# Patient Record
Sex: FEMALE | Race: WHITE | URBAN - METROPOLITAN AREA
[De-identification: names, ages, dates, MRNs, and addresses within clinical notes are randomized per-mention and may not be internally consistent; named-entity substitution may affect disease eponyms.]

---

## 2018-06-18 ENCOUNTER — CONVERSION ENCOUNTER (OUTPATIENT)
Dept: MAMMOGRAPHY | Facility: HOSPITAL | Age: 68
End: 2018-06-18

## 2019-03-19 ENCOUNTER — OFFICE VISIT CONVERTED (OUTPATIENT)
Dept: NEUROSURGERY | Facility: CLINIC | Age: 69
End: 2019-03-19
Attending: NEUROLOGICAL SURGERY

## 2019-03-19 ENCOUNTER — CONVERSION ENCOUNTER (OUTPATIENT)
Dept: NEUROLOGY | Facility: CLINIC | Age: 69
End: 2019-03-19

## 2019-03-26 ENCOUNTER — HOSPITAL ENCOUNTER (OUTPATIENT)
Dept: MRI IMAGING | Facility: HOSPITAL | Age: 69
Discharge: HOME | End: 2019-03-26
Attending: NEUROLOGICAL SURGERY

## 2019-04-18 ENCOUNTER — OFFICE VISIT CONVERTED (OUTPATIENT)
Dept: NEUROSURGERY | Facility: CLINIC | Age: 69
End: 2019-04-18
Attending: NEUROLOGICAL SURGERY

## 2019-04-30 ENCOUNTER — HOSPITAL ENCOUNTER (OUTPATIENT)
Dept: PREADMISSION TESTING | Facility: HOSPITAL | Age: 69
Setting detail: HOSPITAL OUTPATIENT SURGERY
Discharge: HOME | End: 2019-04-30
Attending: NEUROLOGICAL SURGERY

## 2019-04-30 LAB
ANION GAP SERPL CALC-SCNC: 15 MMOL/L (ref 8–19)
BASOPHILS # BLD AUTO: 0.03 10*3/UL (ref 0–0.2)
BASOPHILS NFR BLD AUTO: 0.4 % (ref 0–3)
BUN SERPL-MCNC: 18 MG/DL (ref 5–25)
BUN/CREAT SERPL: 12 {RATIO} (ref 6–20)
CALCIUM SERPL-MCNC: 9.3 MG/DL (ref 8.7–10.4)
CHLORIDE SERPL-SCNC: 99 MMOL/L (ref 99–111)
CONV ABS IMM GRAN: 0.02 10*3/UL (ref 0–0.2)
CONV CO2: 30 MMOL/L (ref 22–32)
CONV IMMATURE GRAN: 0.3 % (ref 0–1.8)
CREAT UR-MCNC: 1.45 MG/DL (ref 0.5–0.9)
DEPRECATED RDW RBC AUTO: 45.2 FL (ref 36.4–46.3)
EOSINOPHIL # BLD AUTO: 0.16 10*3/UL (ref 0–0.7)
EOSINOPHIL # BLD AUTO: 2 % (ref 0–7)
ERYTHROCYTE [DISTWIDTH] IN BLOOD BY AUTOMATED COUNT: 13.3 % (ref 11.7–14.4)
GFR SERPLBLD BASED ON 1.73 SQ M-ARVRAT: 36 ML/MIN/{1.73_M2}
GLUCOSE SERPL-MCNC: 109 MG/DL (ref 65–99)
HBA1C MFR BLD: 13.7 G/DL (ref 12–16)
HCT VFR BLD AUTO: 43.8 % (ref 37–47)
LYMPHOCYTES # BLD AUTO: 1.35 10*3/UL (ref 1–5)
MCH RBC QN AUTO: 28.7 PG (ref 27–31)
MCHC RBC AUTO-ENTMCNC: 31.3 G/DL (ref 33–37)
MCV RBC AUTO: 91.8 FL (ref 81–99)
MONOCYTES # BLD AUTO: 0.48 10*3/UL (ref 0.2–1.2)
MONOCYTES NFR BLD AUTO: 6.1 % (ref 3–10)
NEUTROPHILS # BLD AUTO: 5.85 10*3/UL (ref 2–8)
NEUTROPHILS NFR BLD AUTO: 74.1 % (ref 30–85)
NRBC CBCN: 0 % (ref 0–0.7)
OSMOLALITY SERPL CALC.SUM OF ELEC: 292 MOSM/KG (ref 273–304)
PLATELET # BLD AUTO: 178 10*3/UL (ref 130–400)
PMV BLD AUTO: 9.8 FL (ref 9.4–12.3)
POTASSIUM SERPL-SCNC: 4 MMOL/L (ref 3.5–5.3)
RBC # BLD AUTO: 4.77 10*6/UL (ref 4.2–5.4)
SODIUM SERPL-SCNC: 140 MMOL/L (ref 135–147)
VARIANT LYMPHS NFR BLD MANUAL: 17.1 % (ref 20–45)
WBC # BLD AUTO: 7.89 10*3/UL (ref 4.8–10.8)

## 2019-05-06 ENCOUNTER — HOSPITAL ENCOUNTER (OUTPATIENT)
Dept: PERIOP | Facility: HOSPITAL | Age: 69
Setting detail: HOSPITAL OUTPATIENT SURGERY
Discharge: HOME | End: 2019-05-06
Attending: NEUROLOGICAL SURGERY

## 2019-05-06 LAB
GLUCOSE BLD-MCNC: 130 MG/DL (ref 65–99)
GLUCOSE BLD-MCNC: 159 MG/DL (ref 65–99)

## 2019-05-30 ENCOUNTER — OFFICE VISIT CONVERTED (OUTPATIENT)
Dept: NEUROSURGERY | Facility: CLINIC | Age: 69
End: 2019-05-30
Attending: PHYSICIAN ASSISTANT

## 2019-08-07 ENCOUNTER — HOSPITAL ENCOUNTER (OUTPATIENT)
Dept: OTHER | Facility: HOSPITAL | Age: 69
Discharge: HOME | End: 2019-08-07
Attending: PHYSICAL MEDICINE & REHABILITATION

## 2019-08-07 LAB
APPEARANCE UR: CLEAR
BILIRUB UR QL: NEGATIVE
COLOR UR: YELLOW
CONV BACTERIA: ABNORMAL
CONV COLLECTION SOURCE (UA): ABNORMAL
CONV HYALINE CASTS IN URINE MICRO: ABNORMAL /[LPF]
CONV UROBILINOGEN IN URINE BY AUTOMATED TEST STRIP: 0.2 {EHRLICHU}/DL (ref 0.1–1)
GLUCOSE UR QL: NEGATIVE MG/DL
HGB UR QL STRIP: NEGATIVE
KETONES UR QL STRIP: NEGATIVE MG/DL
LEUKOCYTE ESTERASE UR QL STRIP: ABNORMAL
NITRITE UR QL STRIP: POSITIVE
PH UR STRIP.AUTO: 5.5 [PH] (ref 5–8)
PROT UR QL: NEGATIVE MG/DL
RBC #/AREA URNS HPF: ABNORMAL /[HPF]
SP GR UR: 1.01 (ref 1–1.03)
WBC #/AREA URNS HPF: ABNORMAL /[HPF]

## 2019-08-10 LAB
AMOXICILLIN+CLAV SUSC ISLT: 8
AMPICILLIN SUSC ISLT: >=32
AMPICILLIN+SULBAC SUSC ISLT: 16
BACTERIA UR CULT: ABNORMAL
CEFAZOLIN SUSC ISLT: <=4
CEFEPIME SUSC ISLT: <=1
CEFTAZIDIME SUSC ISLT: <=1
CEFTRIAXONE SUSC ISLT: <=1
CEFUROXIME ORAL SUSC ISLT: >=64
CEFUROXIME PARENTER SUSC ISLT: >=64
CIPROFLOXACIN SUSC ISLT: >=4
ERTAPENEM SUSC ISLT: <=0.5
GENTAMICIN SUSC ISLT: <=1
LEVOFLOXACIN SUSC ISLT: >=8
NITROFURANTOIN SUSC ISLT: <=16
TETRACYCLINE SUSC ISLT: >=16
TMP SMX SUSC ISLT: >=320
TOBRAMYCIN SUSC ISLT: <=1

## 2019-09-25 ENCOUNTER — HOSPITAL ENCOUNTER (OUTPATIENT)
Dept: OTHER | Facility: HOSPITAL | Age: 69
Discharge: HOME | End: 2019-09-25
Attending: PHYSICAL MEDICINE & REHABILITATION

## 2019-09-25 LAB
C DIFF TOX B STL QL CT TISS CULT: NEGATIVE
CONV 027 TOXIN: NEGATIVE

## 2019-09-27 LAB — BACTERIA SPEC AEROBE CULT: NORMAL

## 2019-10-10 ENCOUNTER — HOSPITAL ENCOUNTER (OUTPATIENT)
Dept: OTHER | Facility: HOSPITAL | Age: 69
Discharge: HOME | End: 2019-10-10
Attending: PHYSICAL MEDICINE & REHABILITATION

## 2019-10-10 LAB
APPEARANCE UR: CLEAR
BILIRUB UR QL: NEGATIVE
COLOR UR: YELLOW
CONV COLLECTION SOURCE (UA): NORMAL
CONV UROBILINOGEN IN URINE BY AUTOMATED TEST STRIP: 0.2 {EHRLICHU}/DL (ref 0.1–1)
GLUCOSE UR QL: NEGATIVE MG/DL
HGB UR QL STRIP: NEGATIVE
KETONES UR QL STRIP: NEGATIVE MG/DL
LEUKOCYTE ESTERASE UR QL STRIP: NEGATIVE
NITRITE UR QL STRIP: NEGATIVE
PH UR STRIP.AUTO: 5 [PH] (ref 5–8)
PROT UR QL: NEGATIVE MG/DL
SP GR UR: 1.01 (ref 1–1.03)

## 2019-10-12 LAB
AMOXICILLIN+CLAV SUSC ISLT: >=32
AMPICILLIN SUSC ISLT: >=32
AMPICILLIN+SULBAC SUSC ISLT: >=32
BACTERIA UR CULT: ABNORMAL
CEFAZOLIN SUSC ISLT: >=64
CEFEPIME SUSC ISLT: <=1
CEFTAZIDIME SUSC ISLT: >=64
CEFTRIAXONE SUSC ISLT: >=64
CEFUROXIME ORAL SUSC ISLT: >=64
CEFUROXIME PARENTER SUSC ISLT: >=64
CIPROFLOXACIN SUSC ISLT: <=0.25
ERTAPENEM SUSC ISLT: <=0.5
GENTAMICIN SUSC ISLT: <=1
LEVOFLOXACIN SUSC ISLT: <=0.12
NITROFURANTOIN SUSC ISLT: <=16
TETRACYCLINE SUSC ISLT: <=1
TMP SMX SUSC ISLT: <=20
TOBRAMYCIN SUSC ISLT: <=1

## 2019-11-01 ENCOUNTER — HOSPITAL ENCOUNTER (OUTPATIENT)
Dept: MAMMOGRAPHY | Facility: HOSPITAL | Age: 69
Discharge: HOME | End: 2019-11-01
Attending: NURSE PRACTITIONER

## 2019-11-06 ENCOUNTER — OFFICE VISIT CONVERTED (OUTPATIENT)
Dept: GASTROENTEROLOGY | Facility: CLINIC | Age: 69
End: 2019-11-06
Attending: NURSE PRACTITIONER

## 2020-02-01 ENCOUNTER — LAB REQUISITION (OUTPATIENT)
Dept: LAB | Facility: HOSPITAL | Age: 70
End: 2020-02-01

## 2020-02-01 DIAGNOSIS — Z00.00 ROUTINE GENERAL MEDICAL EXAMINATION AT A HEALTH CARE FACILITY: ICD-10-CM

## 2020-02-01 LAB
ANION GAP SERPL CALCULATED.3IONS-SCNC: 19.1 MMOL/L (ref 5–15)
BUN BLD-MCNC: 49 MG/DL (ref 8–23)
BUN/CREAT SERPL: 10.5 (ref 7–25)
CALCIUM SPEC-SCNC: 8.2 MG/DL (ref 8.6–10.5)
CHLORIDE SERPL-SCNC: 90 MMOL/L (ref 98–107)
CO2 SERPL-SCNC: 25.9 MMOL/L (ref 22–29)
CREAT BLD-MCNC: 4.67 MG/DL (ref 0.57–1)
GFR SERPL CREATININE-BSD FRML MDRD: 11 ML/MIN/1.73
GFR SERPL CREATININE-BSD FRML MDRD: 9 ML/MIN/1.73
GLUCOSE BLD-MCNC: 62 MG/DL (ref 65–99)
POTASSIUM BLD-SCNC: 3.7 MMOL/L (ref 3.5–5.2)
SODIUM BLD-SCNC: 135 MMOL/L (ref 136–145)

## 2020-02-01 PROCEDURE — 80048 BASIC METABOLIC PNL TOTAL CA: CPT

## 2020-02-27 ENCOUNTER — OFFICE VISIT CONVERTED (OUTPATIENT)
Dept: PULMONOLOGY | Facility: CLINIC | Age: 70
End: 2020-02-27
Attending: PHYSICIAN ASSISTANT

## 2020-03-13 ENCOUNTER — HOSPITAL ENCOUNTER (OUTPATIENT)
Dept: OTHER | Facility: HOSPITAL | Age: 70
Discharge: HOME | End: 2020-03-13

## 2020-03-14 ENCOUNTER — HOSPITAL ENCOUNTER (OUTPATIENT)
Dept: OTHER | Facility: HOSPITAL | Age: 70
Discharge: HOME | End: 2020-03-14

## 2020-03-14 LAB
ANION GAP SERPL CALC-SCNC: 15 MMOL/L (ref 8–19)
BUN SERPL-MCNC: 32 MG/DL (ref 5–25)
BUN/CREAT SERPL: 24 {RATIO} (ref 6–20)
CALCIUM SERPL-MCNC: 8.4 MG/DL (ref 8.7–10.4)
CHLORIDE SERPL-SCNC: 99 MMOL/L (ref 99–111)
CONV CO2: 28 MMOL/L (ref 22–32)
CREAT UR-MCNC: 1.33 MG/DL (ref 0.5–0.9)
GFR SERPLBLD BASED ON 1.73 SQ M-ARVRAT: 40 ML/MIN/{1.73_M2}
GLUCOSE SERPL-MCNC: 84 MG/DL (ref 65–99)
OSMOLALITY SERPL CALC.SUM OF ELEC: 294 MOSM/KG (ref 273–304)
POTASSIUM SERPL-SCNC: 3.4 MMOL/L (ref 3.5–5.3)
SODIUM SERPL-SCNC: 139 MMOL/L (ref 135–147)

## 2020-03-18 ENCOUNTER — HOSPITAL ENCOUNTER (OUTPATIENT)
Dept: OTHER | Facility: HOSPITAL | Age: 70
Discharge: HOME | End: 2020-03-18

## 2020-03-18 LAB
FLUAV RNA SPEC QL NAA+PROBE: NEGATIVE
FLUBV RNA ISLT QL NAA+PROBE: NEGATIVE

## 2020-03-20 LAB — BACTERIA SPEC AEROBE CULT: NORMAL

## 2021-05-15 VITALS
SYSTOLIC BLOOD PRESSURE: 157 MMHG | WEIGHT: 234 LBS | BODY MASS INDEX: 36.73 KG/M2 | DIASTOLIC BLOOD PRESSURE: 71 MMHG | HEART RATE: 74 BPM | HEIGHT: 67 IN

## 2021-05-15 VITALS
WEIGHT: 228.25 LBS | SYSTOLIC BLOOD PRESSURE: 154 MMHG | DIASTOLIC BLOOD PRESSURE: 53 MMHG | BODY MASS INDEX: 34.59 KG/M2 | HEIGHT: 68 IN

## 2021-05-15 VITALS
SYSTOLIC BLOOD PRESSURE: 168 MMHG | WEIGHT: 229.25 LBS | DIASTOLIC BLOOD PRESSURE: 71 MMHG | BODY MASS INDEX: 34.75 KG/M2 | HEIGHT: 68 IN

## 2021-05-28 VITALS
RESPIRATION RATE: 16 BRPM | BODY MASS INDEX: 33.74 KG/M2 | OXYGEN SATURATION: 98 % | SYSTOLIC BLOOD PRESSURE: 127 MMHG | DIASTOLIC BLOOD PRESSURE: 45 MMHG | HEART RATE: 79 BPM | WEIGHT: 215 LBS | HEIGHT: 67 IN | TEMPERATURE: 98.2 F

## 2021-05-28 NOTE — PROGRESS NOTES
Patient: MICHI LUNA     Acct: [id number]     Report: #[id number]  UNIT #: [id number]     : 1950    Encounter Date:2020  PRIMARY CARE: RADHA RAZA  ***Signed***  --------------------------------------------------------------------------------------------------------------------  Chief Complaint      Encounter Date      2020            Primary Care Provider      RADHA RAZA            Referring Provider            [facility]            Patient Complaint      Patient is complaining of      Patient is here for [facility] f/u            VITALS      Height 5 ft 7 in / 170.18 cm      Weight 215 lbs  / 97.468264 kg      BSA 2.09 m2      BMI 33.7 kg/m2      Temperature 98.2 F / 36.78 C - Oral      Pulse 79      Respirations 16      Blood Pressure 127/45 Sitting, Left Arm      Pulse Oximetry 98%, room air            HPI      The patient is a very pleasant 70 year white female recently seen by Dr. Lopez     at [facility] in 2020. Prior to that she was seen by Dr. Bruner and Dr. Mcmanus at the end of 2020. She has presented for her     first hospitalization with shortness of breath and treated for diastolic heart     failure. She was diuresed and gradually improved, was at a nursing facility for     rehab and then readmitted beginning on 2020 and seen by Dr. Lopez and Dr. Bruner after being found to have acute hypoxic respiratory failure. She     required BiPAP ,  was found to have ESBL urinary tract infection and was treated    with antibiotics. She also appeared to have pulmonary edema on chest x-ray and     was diuresed, seen by nephrology for acute on chronic kidney disease. She     gradually improved and was able to be discharged home. She was also treated for     apparent chronic obstructive pulmonary disease exacerbation. The patient states     she was diagnosed with chronic obstructive pulmonary disease many years ago and     has not been followed  by a pulmonologist. She only smoked for about 10 years 1     pack per day or less and quit smoking in her 20s. She has been on advair 250/50     discus and spiriva that she gets a [employer]. She has also been using Brovana and    Pulmicort nebulizer since her discharge and was not aware that she was not     supposed to be using both of these. She has DuoNeb to use as needed. Her main     complaint is that she is weak and deconditioned after her recent     hospitalizations. She will be doing home physical therapy which starts tomorrow.    She denies any dyspnea that limits her day to day activities, more so limited by    weakness and deconditioning. She denies coughing, wheezing, hemoptysis, fever or    chills.             I reviewed the Review of Systems, medical, surgical and family history and agree    with those as entered.      Copies To:   JANE OLIVAREZ      Constitutional:  Denies: Fatigue, Fever, Weight gain, Weight loss, Chills,     Insomnia, Other      Respiratory/Breathing:  Denies: Shortness of air, Wheezing, Cough, Hemoptysis,     Pleuritic pain, Other      Endocrine:  Denies: Polydipsia, Polyuria, Heat/cold intolerance, Abnorml     menstrual pattern, Diabetes, Other      Eyes:  Denies: Blurred vision, Vision Changes, Other      Ears, nose, mouth, throat:  Denies: Mouth lesions, Thrush, Throat pain,     Hoarseness, Allergies/Hay Fever, Post Nasal Drip, Headaches, Recent Head Injury,    Nose Bleeding, Neck Stiffness, Thyroid Mass, Hearing Loss, Ear Fullness, Dry     Mouth, Nasal or Sinus Pain, Dry Lips, Nasal discharge, Nasal congestion, Other      Cardiovascular:  Denies: Palpitations, Syncope, Claudication, Chest Pain, Wake     up Gasping for air, Leg Swelling, Irregular Heart Rate, Cyanosis, Dyspnea on     Exertion, Other      Gastrointestinal:  Denies: Nausea, Constipation, Diarrhea, Abdominal pain,     Vomiting, Difficulty Swallowing, Reflux/Heartburn, Dysphagia, Jaundice,     Bloating,  "Melena, Bloody stools, Other      Genitourinary:  Denies: Urinary frequency, Incontinence, Hematuria, Urgency,     Nocturia, Dysuria, Testicular problems, Other      Musculoskeletal:  Denies: Joint Pain, Joint Stiffness, Joint Swelling, Myalgias,    Other      Hematologic/lymphatic:  DENIES: Lymphadenopathy, Bruising, Bleeding tendencies,     Other      Neurological:  Denies: Headache, Numbness, Weakness, Seizures, Other      Psychiatric:  Denies: Anxiety, Appropriate Effect, Depression, Other      Sleep:  No: Excessive daytime sleep, Morning Headache?, Snoring, Insomnia?, Stop    breathing at sleep?, Other      Integumentary:  Denies: Rash, Dry skin, Skin Warm to Touch, Other      Immunologic/Allergic:  Denies: Latex allergy, Seasonal allergies, Asthma,     Urticaria, Eczema, Other      Immunization status:  No: Up to date            FAMILY/SOCIAL/MEDICAL HX      Current History            Problems         Chronic Problems:         V76.12 OTH SCREEN MAMMO-MALIGN NEOPLASM OF BREAST (Onset: 2/11/13)         250.00 Diabetes mellitus (Onset: 2/11/13)         OTHER MEDICAL PROVIDERS (Onset: 2/11/13, Chronic)              ortho- ft. prather              gyn- ft. prather              [facility]              [facility]                       Resolved Problems:         244.9 Hypothyroidism (Recorded: 2/11/13)         780.57 Sleep apnea (Recorded: 2/11/13)         333.94 Restless legs (Recorded: 2/11/13)         250.00 Diabetes mellitus type 2 (Recorded: 2/11/13)         496 Chronic obstructive lung disease (Recorded: 2/11/13)      Surgical History:  Yes: Abdominal Surgery (Gastric Bypass 7/14/2008), Bladder     Surgery (Bladder repair 12-6-05), Bowel Surgery (COLONOSCOPY), Head Surgery     (BILATERAL CATARACTS ), Vascular Surgery (states \"carotid artery surgery 2017\"),    Other Surgeries (Cardiac Cath 9/18/2003); No: Appendectomy, CABG,     Cholecystectomy, Oral Surgery, Orthopedic Surgery (R knee replacement 3/24/2010,    L " "KNEE 6/18/2014, discs in back)      Diabetes - Family Hx:  Father      Cancer/Type - Family Hx:  Mother (Pacrease/Breast Ca), Father (Bladder Ca),     Sister (Squamous)      Is Father Still Living?:  No      Is Mother Still Living?:  No      Social History:  No Tobacco Use, No Alcohol Use, No Recreational Drug use      Smoking status:  Former smoker (less and 1 ppd x 12 years quit in 1995)      Exercise Activity:  None      Occupation:  Retired      [place]/Social Activities:  Reading      Whom do you live with?:  Spouse      Number of Pregnancies:  1      Number of Delivers:  1      Age at menopause:  48      Anticoagulation Therapy:  No      Antibiotic Prophylaxis:  No      Medical History:  Yes: Allergies, Arthritis (HANDS, BACK), Chronic     Bronchitis/COPD (USES INHALERS), Congestive Heart Failu, Depression, Anxiety,     Diabetes (TYPE II, BS RUN \"110-149\"), Heart Attack (august 25th 2017- HEART     CATHS 2003/2017/2019), Hemorrhoids/Rectal Prob (COLON POLYPS/IBS/ACID REFLUX),     High Blood Pressure (ON MEDICATIONS ), High Cholesterol, Shortness Of Breath     (flash pulm edema 2017/2019, resp failure/hypoxia 2/14/20), Thyroid Problem; No:    Asthma, Blood Disease, Chemotherapy/Cancer, Deafness or Ringing Ears, Seizures,     Night sweats, Miscellaneous Medical/oth      Psychiatric History      anxiety and depression            PREVENTION      Hx Influenza Vaccination:  Yes      Date Influenza Vaccine Given:  Nov 1, 2019      Influenza Vaccine Declined:  No      2 or More Falls Past Year?:  No      Fall Past Year with Injury?:  No      Hx Pneumococcal Vaccination:  No      Encouraged to follow-up with:  PCP regarding preventative exams.      Chart initiated by      Marizol Thibodeaux MA            ALLERGIES/MEDICATIONS      Allergies:        Coded Allergies:             NITROFURANTOIN (Verified  Adverse Reaction, Severe, RASH, 2/27/20)      Medications    Last Reconciled on 2/27/20 09:21 by TANGELA STEPHENSON    "   MDI-Advair 250/50 (Advair 250/50 Diskus) 1 Each Blst.w.dev      1 PUFF INH HS, #1 INH 0 Refills         Reported         2/27/20       Albuterol/Ipratropium (Duoneb) 3 Ml Ampul.neb      3 ML INH BID PRN for SHORTNESS OF BREATH, #120 NEB 0 Refills         Reported         2/27/20       Mouthwash (Magic Mouthwash) 1 Each Bottle      5 ML SWISH.SPIT Q6H PRN for MOUTH DISCOMFORT for 14 Days, #1 BOTTLE 0 Refills         Prov: Ankur Dai         2/19/20       Ertapenem Sodium (INVanz) 1 Gm Vial      1000 MG IV ONCE for 9 Days, #9 VIAL         Prov: Ankur Dai         2/19/20       Furosemide (Furosemide) 20 Mg Tablet      40 MG PO QDAY for 30 Days, #60 TAB 0 Refills         Prov: Ankur Dai         2/19/20       Glucosamine Hcl/Chondro Villalta A (GLUCOSAMINE-CHONDROITIN 500 mg/400 mg/10 ml) 480     Ml Liquid      10 ML PO BID, ML         Reported         2/14/20       Pramipexole (Pramipexole) 0.5 Mg Tab      0.5 MG PO QDAY for 30 Days, #30 TAB         Reported         2/14/20       Metoprolol Succinate (Metoprolol Succinate) 25 Mg Tab.er.24h      25 MG PO BID, #60 TAB 0 Refills         Reported         2/14/20       Acetaminophen (ACETAMINOPHEN) 325 Mg Tablet      650 MG PO Q4H PRN for MILD PAIN (1-3)/FEVER, #100 TAB         Reported         2/14/20       Gabapentin (Gabapentin) 800 Mg Tablet      800 MG PO BID, #60 TAB 0 Refills         Reported         2/14/20       Pantoprazole (Protonix) 40 Mg Tablet.      40 MG PO QDAY@07, #30 TAB 0 Refills         Reported         2/14/20       NIFEdipine ER (NIFEdipine ER) 30 Mg Tab.er.24      30 MG PO TID, #30 TAB.SR 0 Refills         Reported         2/14/20       Cholecalciferol (Vitamin D3) (Thera-D) 4,000 Unit Tablet      4000 UNITS PO QDAY for 30 Days, #30 TAB         Reported         2/14/20       Aspirin EC (Aspirin EC) 81 Mg Tablet.      81 MG PO QDAY@08 for 30 Days, #30 TAB.SR         Prov: Kingsley Wilburn         1/29/20       Neb-Budesonide (Budesonide) 0.5 Mg/2  Ml Ampul.neb      0.5 MG INH RTBID, #40 NEB 3 Refills         Prov: Joe Cunha         12/23/19       Potassium Chloride (Potassium Chloride) 10 Meq Capsule.er      20 MEQ PO QDAY, #30 CAP.ER 1 Refill         Prov: Joe Cunha         12/23/19       Arformoterol Tartrate (Brovana) 15 Mcg/2 Ml Vial.neb      15 MCG INH RTBID, #40 NEB 3 Refills         Prov: Joe Cunha         12/23/19       Calcium Carbonate (Calcium Carbonate) 200 Mg Tab.chew      200 MG PO BID, #30 TAB.CHEW         Reported         12/14/19       Tiotropium (Spiriva) 18 Mcg Inh      18 MCG INH RTQDAY, #1 INH 0 Refills         Reported         12/14/19       Sucralfate (Carafate) 1 Gm Tab      1 GM PO QIDAC for 28 Days, #112 TAB         Prov: CAMACHO GARCIA         12/10/19       traMADol HCl (traMADol HCl) 50 Mg Tablet      50 MG PO Q4H PRN for PAIN, TAB 0 Refills         Reported         12/6/19       Clobetasol Propionate (Temovate 0.05% Oint) 15 Gm Oint...g.      1 APL TOPICAL BID, TUBE         Reported         12/6/19       chlordiazePOXIDE-Clidinium 5-2.5 Mg (LIBRAX) 1 Each Capsule      1 CAP PO BID, CAP         Reported         4/19/19       Acarbose (Precose) 50 Mg Tab      50 MG PO QDAY, #90 TAB         Reported         4/19/19       Nitroglycerin (Nitroglycerin) 0.4 Mg Tab.subl      0.4 MG SL Q5MIN PRN for CHEST PAIN, #30 TAB         Prov: NIALL MORTON         9/7/17       Atorvastatin (Atorvastatin) 20 Mg Tab      20 MG PO HS, #30 TAB 0 Refills         Reported         7/15/16       Allopurinol (Allopurinol) 100 Mg Tablet      100 MG PO HS, #30 TAB 0 Refills         Reported         1/14/16       Levothyroxine (Synthroid) 0.125 Mg Tablet      125 MCG PO QAM, #90 TAB 1 Refill         Prov: RADHA WOODWARD         3/25/13       Multivitamins (Multi-Vitamin) 1 Tab Tablet      1 TAB PO QDAY         Reported         2/11/13       buPROPion HCl XL (Wellbutrin XL) 150 Mg Tab.sr.24h      150 MG PO QAM         Reported         2/11/13       Current Medications      Current Medications Reviewed 2/27/20            EXAM      VITAL SIGNS:  Reviewed.        NECK:  Supple without tracheal deviation or lymphadenopathy.  No thyromegaly     appreciated.      LYMPHATICS:  No cervical or supraclavicular lymphadenopathy.      HEENT: Pupils are equal, round and reactive to light. There is no scleral icte    jennifer.  Nares patent without hypertrophy of the turbinates. No erythema of the     passages.  TMs are clear bilaterally with good cone of light. The posterior     pharynx is without  lesions or erythema.      RESPIRATORY: Mildly decreased breath sounds throughout, no wheezes, rhonchi or     crackles, normal work of breathing noted.        CARDIOVASCULAR:  Regular rate and rhythm.  No murmurs, gallops or rubs.  No     lower extremity edema.  Equal radial pulses.        GI: Soft, nontender, nondistended, no organomegaly.  Bowel sounds present in all    four quadrants.      MUSCULOSKELETAL:  No joint effusions, erythema or warmth over the major joint     systems.      SKIN:  No rashes or lesions.      NEUROLOGIC: Cranial nerves II-XII are intact bilaterally.  Moves all     extremities. Ambulates with ease.      PSYCH:  Appropriate mood and affect.      Vtials      Vitals:             Height 5 ft 7 in / 170.18 cm           Weight 215 lbs  / 97.639230 kg           BSA 2.09 m2           BMI 33.7 kg/m2           Temperature 98.2 F / 36.78 C - Oral           Pulse 79           Respirations 16           Blood Pressure 127/45 Sitting, Left Arm           Pulse Oximetry 98%, room air            REVIEW      Results Reviewed      PCCS Results Reviewed?:  Yes Prev Lab Results, Yes Prev Radiology Results, Yes     Previous [facility] Records      Lab Results      I personally reviewed the patient's most recent pulmonary consultation, progress    notes and discharge summary.            Assessment      COPD (chronic obstructive pulmonary disease) - J44.9            Notes      New  Medications      * MDI-Advair 250/50 (Advair 250/50 Diskus) 1 EACH BLST.W.DEV: 1 PUFF INH HS #1      * Albuterol/Ipratropium (Duoneb) 3 ML AMPUL.NEB: 3 ML INH BID PRN SHORTNESS OF       BREATH #120         Instructions: DIAGNOSIS CODE REQUIRED PRIOR TO PRESCRIBING.      * MDI-Advair 250/50 (Advair 250/50 Diskus) 1 EACH BLST.W.DEV: 1 PUFF INH RTBID       #1      * TIOTROPIUM BROMIDE (Spiriva Respimat 2.5 mcg/Puff) 4 GM MIST.INHAL: 2 PUFFS       INH RTQDAY #1      Discontinued Medications      * ARFORMOTEROL TARTRATE (Brovana) 15 MCG/2 ML VIAL.NEB: 15 MCG INH RTBID #40         Instructions: DIAGNOSIS CODE REQUIRED PRIOR TO PRESCRIBING.      * Neb-Budesonide (Budesonide) 0.5 MG/2 ML AMPUL.NEB: 0.5 MG INH RTBID #40         Instructions: DIAGNOSIS CODE REQUIRED PRIOR TO PRESCRIBING.      New Diagnostics      * PFT-Comp, PrePost,DLCO,BodyBox, Month         Dx: COPD (chronic obstructive pulmonary disease) - J44.9      * 6 Min Walk w O2 Titration Test, Month         Dx: COPD (chronic obstructive pulmonary disease) - J44.9      * Alpha 1 Antitrypsin , Routine         Dx: COPD (chronic obstructive pulmonary disease) - J44.9      * Split Night Sleep Study, Month         Dx: COPD (chronic obstructive pulmonary disease) - J44.9      ASSESSMENT/PLAN:      1. I have discussed with the patient and her brother regarding her recent     hospitalizations and plans going forward.       2. I recommend doing baseline pulmonary function test and 6 minute walk test in     1 month to establish her baseline lung function.       3. It was suspected that she had obstructive sleep apnea during her     hospitalization. The patient states she had a  sleep study many years ago but     has not been on a CPAP recently. I will order a split night study now given her     fatigue and reported snoring. She will be set up for CPAP as appropriate pending    results.       4. I will do alpha 1 antitrypsin testing today.       5. I have counseled the patient  and her brother in detail on how and when to use    her breathing medicines. She prefers to stay on scheduled inhalers rather than     nebulizer so I will discontinue Brovana and Pulmicort nebulizer, continue advair    250/50 and continue Spiriva Respimat 2.5 mcg 2 puffs once daily. Continue DuoNeb    as needed.       6. Continue to follow up with her primary care provider and nephrology given her    recent acute on chronic kidney disease. She will follow up with her primary care    provider for management of her type II diabetes mellitus and hypothyroidism.       7. Continue home physical therapy and occupational therapy and gradually     increase her activity as tolerated.       8. She is up to date on flu vaccine. She has had pneumonia vaccines before but     is not sure when. I will have her follow up with her primary care provider about    this since her pneumonia vaccines were done there.       9. Remote former tobacco smoker in remission longstanding.       1o. Follow up in 2 months to discuss test results with one of my partners,     sooner if needed.            Patient Education      Patient Education Provided:  COPD, How to use an Inhaler, How to use a Nebulizer      Time Spent:  > 50% /[facility]            Patient Education:        Chronic Obstructive Pulmonary Disease            Electronically signed by GIANCARLO POSADA PA-C  03/04/2020 08:30       Disclaimer: Converted document may not contain table formatting or lab diagrams. Please see [employer] [facility] for the authenticated document.